# Patient Record
Sex: FEMALE | Race: WHITE | NOT HISPANIC OR LATINO
[De-identification: names, ages, dates, MRNs, and addresses within clinical notes are randomized per-mention and may not be internally consistent; named-entity substitution may affect disease eponyms.]

---

## 2017-02-08 ENCOUNTER — HOSPITAL (OUTPATIENT)
Dept: OTHER | Age: 37
End: 2017-02-08
Attending: OBSTETRICS & GYNECOLOGY

## 2019-05-17 PROCEDURE — 87624 HPV HI-RISK TYP POOLED RSLT: CPT | Performed by: INTERNAL MEDICINE

## 2019-05-17 PROCEDURE — 86803 HEPATITIS C AB TEST: CPT | Performed by: INTERNAL MEDICINE

## 2019-05-17 PROCEDURE — 88175 CYTOPATH C/V AUTO FLUID REDO: CPT | Performed by: INTERNAL MEDICINE

## 2019-05-17 PROCEDURE — 87389 HIV-1 AG W/HIV-1&-2 AB AG IA: CPT | Performed by: INTERNAL MEDICINE

## 2019-09-17 PROCEDURE — 82397 CHEMILUMINESCENT ASSAY: CPT | Performed by: NURSE PRACTITIONER

## 2019-09-17 PROCEDURE — 86141 C-REACTIVE PROTEIN HS: CPT | Performed by: NURSE PRACTITIONER

## 2020-04-08 PROBLEM — R63.5 WEIGHT GAIN: Status: ACTIVE | Noted: 2020-04-08

## 2020-08-05 PROBLEM — Z51.81 ENCOUNTER FOR THERAPEUTIC DRUG MONITORING: Status: ACTIVE | Noted: 2020-08-05

## 2020-08-05 PROBLEM — E66.3 OVERWEIGHT (BMI 25.0-29.9): Status: ACTIVE | Noted: 2020-08-05

## 2020-10-02 ENCOUNTER — HOSPITAL ENCOUNTER (INPATIENT)
Facility: HOSPITAL | Age: 40
LOS: 1 days | Discharge: HOME | DRG: 378 | End: 2020-10-03
Attending: STUDENT IN AN ORGANIZED HEALTH CARE EDUCATION/TRAINING PROGRAM | Admitting: HOSPITALIST
Payer: COMMERCIAL

## 2020-10-02 DIAGNOSIS — K92.2 GASTROINTESTINAL HEMORRHAGE, UNSPECIFIED GASTROINTESTINAL HEMORRHAGE TYPE: Primary | ICD-10-CM

## 2020-10-02 DIAGNOSIS — D62 ACUTE BLOOD LOSS ANEMIA: ICD-10-CM

## 2020-10-02 PROCEDURE — 85730 THROMBOPLASTIN TIME PARTIAL: CPT | Performed by: STUDENT IN AN ORGANIZED HEALTH CARE EDUCATION/TRAINING PROGRAM

## 2020-10-02 PROCEDURE — 96374 THER/PROPH/DIAG INJ IV PUSH: CPT

## 2020-10-02 PROCEDURE — 96361 HYDRATE IV INFUSION ADD-ON: CPT

## 2020-10-02 PROCEDURE — 99285 EMERGENCY DEPT VISIT HI MDM: CPT

## 2020-10-02 PROCEDURE — 86920 COMPATIBILITY TEST SPIN: CPT

## 2020-10-02 PROCEDURE — 86850 RBC ANTIBODY SCREEN: CPT | Performed by: STUDENT IN AN ORGANIZED HEALTH CARE EDUCATION/TRAINING PROGRAM

## 2020-10-02 PROCEDURE — 86901 BLOOD TYPING SEROLOGIC RH(D): CPT | Performed by: STUDENT IN AN ORGANIZED HEALTH CARE EDUCATION/TRAINING PROGRAM

## 2020-10-02 PROCEDURE — 85025 COMPLETE CBC W/AUTO DIFF WBC: CPT | Performed by: STUDENT IN AN ORGANIZED HEALTH CARE EDUCATION/TRAINING PROGRAM

## 2020-10-02 PROCEDURE — 80053 COMPREHEN METABOLIC PANEL: CPT | Performed by: STUDENT IN AN ORGANIZED HEALTH CARE EDUCATION/TRAINING PROGRAM

## 2020-10-02 PROCEDURE — 85610 PROTHROMBIN TIME: CPT | Performed by: STUDENT IN AN ORGANIZED HEALTH CARE EDUCATION/TRAINING PROGRAM

## 2020-10-02 PROCEDURE — 86900 BLOOD TYPING SEROLOGIC ABO: CPT | Performed by: STUDENT IN AN ORGANIZED HEALTH CARE EDUCATION/TRAINING PROGRAM

## 2020-10-02 PROCEDURE — C9113 INJ PANTOPRAZOLE SODIUM, VIA: HCPCS | Performed by: STUDENT IN AN ORGANIZED HEALTH CARE EDUCATION/TRAINING PROGRAM

## 2020-10-02 PROCEDURE — 82962 GLUCOSE BLOOD TEST: CPT

## 2020-10-02 RX ORDER — SODIUM CHLORIDE 9 MG/ML
INJECTION, SOLUTION INTRAVENOUS CONTINUOUS
Status: DISCONTINUED | OUTPATIENT
Start: 2020-10-02 | End: 2020-10-03

## 2020-10-02 RX ORDER — SODIUM CHLORIDE 9 MG/ML
INJECTION, SOLUTION INTRAVENOUS CONTINUOUS
Status: ACTIVE | OUTPATIENT
Start: 2020-10-02 | End: 2020-10-03

## 2020-10-02 RX ORDER — DEXTROSE MONOHYDRATE 25 G/50ML
50 INJECTION, SOLUTION INTRAVENOUS
Status: DISCONTINUED | OUTPATIENT
Start: 2020-10-02 | End: 2020-10-03

## 2020-10-03 VITALS
OXYGEN SATURATION: 100 % | TEMPERATURE: 98 F | DIASTOLIC BLOOD PRESSURE: 81 MMHG | BODY MASS INDEX: 24 KG/M2 | HEART RATE: 59 BPM | SYSTOLIC BLOOD PRESSURE: 127 MMHG | RESPIRATION RATE: 18 BRPM | WEIGHT: 135.88 LBS

## 2020-10-03 PROCEDURE — 83550 IRON BINDING TEST: CPT | Performed by: INTERNAL MEDICINE

## 2020-10-03 PROCEDURE — 85027 COMPLETE CBC AUTOMATED: CPT | Performed by: HOSPITALIST

## 2020-10-03 PROCEDURE — 36430 TRANSFUSION BLD/BLD COMPNT: CPT

## 2020-10-03 PROCEDURE — 30233H1 TRANSFUSION OF NONAUTOLOGOUS WHOLE BLOOD INTO PERIPHERAL VEIN, PERCUTANEOUS APPROACH: ICD-10-PCS | Performed by: INTERNAL MEDICINE

## 2020-10-03 PROCEDURE — 83540 ASSAY OF IRON: CPT | Performed by: INTERNAL MEDICINE

## 2020-10-03 PROCEDURE — 82962 GLUCOSE BLOOD TEST: CPT

## 2020-10-03 PROCEDURE — 80048 BASIC METABOLIC PNL TOTAL CA: CPT | Performed by: HOSPITALIST

## 2020-10-03 RX ORDER — MORPHINE SULFATE 2 MG/ML
2 INJECTION, SOLUTION INTRAMUSCULAR; INTRAVENOUS ONCE
Status: COMPLETED | OUTPATIENT
Start: 2020-10-03 | End: 2020-10-03

## 2020-10-03 RX ORDER — SODIUM CHLORIDE 9 MG/ML
INJECTION, SOLUTION INTRAVENOUS ONCE
Status: COMPLETED | OUTPATIENT
Start: 2020-10-03 | End: 2020-10-03

## 2020-10-03 RX ORDER — MORPHINE SULFATE 1 MG/ML
2 INJECTION, SOLUTION EPIDURAL; INTRATHECAL; INTRAVENOUS ONCE
Status: DISCONTINUED | OUTPATIENT
Start: 2020-10-03 | End: 2020-10-03

## 2020-10-03 NOTE — PLAN OF CARE
Assumed care at 0730, A/Ox4, R/A, NSR on tele. Pt morning hgb 7.0. GI s/o, will do colonoscopy outpatient. Patient to transfuse 1 more unit before possible discharge. . [email or web address]. Up ad gian. Will continue to monitor.    Problem: Patient/Family Goals  Goal: Blank Sr

## 2020-10-03 NOTE — ED PROVIDER NOTES
Patient Seen in: [facility] Emergency Department      History   Patient presents with:  Abnormal Result    Stated Complaint: abnormal labs    HPI    Patient is a 56-year-old female presenting to the emergency department after outpatient labs reveale equal, round, and reactive to light. Neck: Normal range of motion. Neck supple. Cardiovascular: Normal rate, regular rhythm, normal heart sounds and intact distal pulses. Exam reveals no gallop and no friction rub. No murmur heard.   Pulmonary/Chest Abnormality         Status                     ---------                               -----------         ------                     ABORH (BLOOD [id number])[[id number]]                               Final result               ANTIBODY [id number]]

## 2020-10-03 NOTE — CONSULTS
Martinez Quinonez Patient Status:  Inpatient   Date of Birth 3/18/1980 MRN [id number]   [facility] 3NE-A Attending Sera Reyes, [address] [facility] Day # 1 PCP Suzan Kumar MD     Date of Con Smokeless tobacco: Never Used    Alcohol use: Yes      Comment: socially  -hard liquor few times a month    Drug use: No      Comment: in the past not currently      ROS:     A comprehensive 10 point review of systems was completed.   Pertinent positives plans for colonoscopy this upcoming week as outpatient.       Alfred Carney MD  [facility]  Gastroenterology

## 2020-10-03 NOTE — PROGRESS NOTES
NURSING DISCHARGE NOTE    Discharged Home via [place]. Accompanied by Support staff  Belongings Taken by patient/family. Discharge paperwork provided and explained. Stressed importance of follow up with GI outpatient.  All questions and concerns addres

## 2020-10-03 NOTE — ED INITIAL ASSESSMENT (HPI)
Pt states had blood work at doctors office today, came back with low hemoglobin of 6.6. Patient denies any symptoms or complaints at this time.

## 2020-10-03 NOTE — PLAN OF CARE
NURSING ADMISSION NOTE      Patient admitted via Cart  Oriented to room. Safety precautions initiated. Bed in low position. Call light in reach. Navigator completed, MD paged.

## 2020-10-03 NOTE — PLAN OF CARE
Pt. A&O x4, on RA, tele shows NSR to sinus tachycardia. VSS. Up ad gian. 1 u PRBCs given. C/o back pain PTA and upon admission, Lidocaine patch placed. Fall and safety precautions in place. Will continue to monitor.     Problem: Patient/Family Goals  Goal: P Monitor lab trends  - Patient is to report abnormal signs of bleeding to staff  - Avoid use of toothpicks and dental floss  - Use electric shaver for shaving  - Use soft bristle tooth brush  - Limit straining and forceful nose blowing  Outcome: Progressing

## 2020-10-03 NOTE — H&P
[facility] Hospitalist Team  History and Physical       Assessment/Plan:     #Anemia - likely gradual decrease over months  -due to GI bleeding  -s/p 1 unit, will transfuse another unit  -advised to take iron after GI eval, f/u with pcp     #GI bleed  -will f/u w ENT:  (-) earache (-) sore throat (-) runny nose  CARDIOVASCULAR:  (-) chest pain  RESPIRATORY:  (-) cough (-) shortness of breath (-) hemoptysis  GASTROINTESTINAL:  (-) nausea (-) vomiting (-) diarrhea  GENITOURINARY:  (-) dysuria (-) frequency (-)urgen 10/02/2020    ALT 24 10/02/2020    PTT 24.6 10/02/2020    INR 0.94 10/02/2020    PTP 12.9 10/02/2020    PGLU 99 10/03/2020          Radiology: No results found. Outpatient records or previous hospital records reviewed.    [facility] hospitalist to continue t

## 2020-10-05 NOTE — PAYOR COMM NOTE
--------------  ADMISSION REVIEW     Payor: [facility] PPO  Subscriber #:  [id number]  Authorization Number: Bianca Richards    Admit date: 10/2/20  Admit time: 2254       Admitting Physician: Stephanie Smith MD  Attending Physician:  No att. providers found  Primar Exam    Constitutional: oriented to person, place, and time, appears well-developed and well-nourished. HENT:   Head: Normocephalic and atraumatic. Eyes: Pupils are equal, round, and reactive to light. Neck: Normal range of motion. Neck supple.    Car TIME (PT)   PTT, ACTIVATED   TYPE AND SCREEN    Narrative: The following orders were created for panel order TYPE AND SCREEN.   Procedure                               Abnormality         Status                     --------- bleed  -will f/u with gi for colonoscopy as outpatient  -see by GI    PPX: SCDS    DISPO: ok for dc after blood transfusion    Florence Antonio DO  [facility] Hospitalist  [phone]    [facility] Hospitalist History and Physical      Patient presents with:  Abnormal symmetrical, trachea midline   Lungs:   Clear to auscultation bilaterally. Normal effort   Chest wall:  No tenderness or deformity   Heart:  Regular rate and rhythm, S1, S2 normal, no murmur, rub or gallop appreciated   Abdomen:   Soft, non-tender.  Bowel s 10/03/2020     HGB 7.0 10/03/2020     HCT 23.8 10/03/2020     .0 10/03/2020     CREATSERUM 0.87 10/03/2020     BUN 12 10/03/2020      10/03/2020     K 4.3 10/03/2020      10/03/2020     CO2 25.0 10/03/2020     GLU 77 10/03/2020     CA 8.

## 2020-10-05 NOTE — PAYOR COMM NOTE
--------------  DISCHARGE REVIEW    Payor: [employer]  Subscriber #:  [id number]  Authorization Number: Katiana Garcia    Admit date: 10/2/20  Admit time:  2254  Discharge Date: 10/3/2020  3:49 PM     Admitting Physician: Kristine Chapman MD  Attending Physician

## 2020-10-07 PROCEDURE — 88305 TISSUE EXAM BY PATHOLOGIST: CPT | Performed by: INTERNAL MEDICINE

## 2020-10-15 PROBLEM — K51.311 ULCERATIVE RECTOSIGMOIDITIS WITH RECTAL BLEEDING (HCC): Status: ACTIVE | Noted: 2020-10-15

## (undated) NOTE — LETTER
[address] [facility] FOR BLOOD OR BLOOD COMPONENTS      In the course of your treatment, it may become necessary to administer a transfusion of blood or blood components.  This form provides basic information concerning this proc If loss of blood poses serious threats in the course of your treatment, THERE IS NO EFFECTIVE ALTERNATIVE TO BLOOD TRANSFUSION.  However, if you have any further questions on this matter, your physician will fully explain the alternatives to you if it has n